# Patient Record
Sex: FEMALE | Race: OTHER | URBAN - METROPOLITAN AREA
[De-identification: names, ages, dates, MRNs, and addresses within clinical notes are randomized per-mention and may not be internally consistent; named-entity substitution may affect disease eponyms.]

---

## 2017-03-09 NOTE — PATIENT DISCUSSION
Continue: Refresh Optive Advanced (lctdhczurhsmacd-fjsajyg-dhba55): drops: 0.5-1-0.5% 1 drop twice a day into both eyes

## 2017-03-09 NOTE — PATIENT DISCUSSION
Continue: PreserVision AREDS 2 (vit c,t-cw-gydbq-lutein-zeaxan): capsule: 002-659-96-6 mg-unit-mg-mg 1 capsule twice a day by mouth

## 2017-03-09 NOTE — PATIENT DISCUSSION
Continue: Refresh Celluvisc (carboxymethylcellulose sodium): dropperette,gel: 1% 1 drop at bedtime into both eyes 03-

## 2017-03-09 NOTE — PATIENT DISCUSSION
AMD (DRY), OU:  PRESCRIBE AREDS 2 VITAMINS / AMSLER GRID DAILY / UV PROTECTION. SMOKING CESSATION EMPHASIZED. REFER TO DR. Nataly Kurtz FOR EVALUATION.

## 2017-09-05 NOTE — PATIENT DISCUSSION
Continue: PreserVision AREDS 2 (vit c,y-dd-ckytz-lutein-zeaxan): capsule: 335-588-43-8 mg-unit-mg-mg 1 capsule twice a day by mouth

## 2017-09-05 NOTE — PATIENT DISCUSSION
Continue: Refresh Optive Advanced (mnbkoccqgppywcw-wbbozxx-hduv07): drops: 0.5-1-0.5% 1 drop twice a day into both eyes

## 2018-01-29 ENCOUNTER — IMPORTED ENCOUNTER (OUTPATIENT)
Dept: URBAN - METROPOLITAN AREA CLINIC 9 | Facility: CLINIC | Age: 83
End: 2018-01-29

## 2018-03-16 ENCOUNTER — IMPORTED ENCOUNTER (OUTPATIENT)
Dept: URBAN - METROPOLITAN AREA CLINIC 9 | Facility: CLINIC | Age: 83
End: 2018-03-16

## 2018-03-28 NOTE — PATIENT DISCUSSION
Continue: Refresh Optive Advanced (syjjrnqaiiezkzm-dmfvqbi-dlvk68): drops: 0.5-1-0.5% 1 drop twice a day into both eyes

## 2018-03-28 NOTE — PATIENT DISCUSSION
Continue: PreserVision AREDS 2 (vit c,f-yv-yjbuh-lutein-zeaxan): capsule: 704-099-10-3 mg-unit-mg-mg 1 capsule twice a day by mouth

## 2018-03-28 NOTE — PATIENT DISCUSSION
MODERATE KERATOCONJUCTIVITIS WITH DRY EYE, OU:  CONTINUE HIGH QUALITY ARTIFICIAL TEARS BID - TID. RECOMMEND THE DAILY INTAKE OF OMEGA-3 DHA/EPA FATTY ACIDS TO HELP RELIEVE SYMPTOMS WITH PRIMARY CARE PHYSICIANS APPROVAL. ADD NIGHTLY LUBRICATING OINTMENT OR GEL. CONTINUE RESTASIS BID OU. WILL CONSIDER PUNCTAL PLUGS NEXT VISIT IF NOT RESPONSIVE OR IF SYMPTOMS PERSIST. RETURN FOR FOLLOW-UP AS SCHEDULED OR SOONER IF SYMPTOMS WORSEN.

## 2018-09-05 NOTE — PATIENT DISCUSSION
Continue: Refresh Optive Advanced (skzwtghizkimztb-xsrscbb-gsdw68): drops: 0.5-1-0.5% 1 drop twice a day into both eyes

## 2018-09-05 NOTE — PATIENT DISCUSSION
Continue: PreserVision AREDS 2 (vit c,j-us-knkif-lutein-zeaxan): capsule: 521-121-75-8 mg-unit-mg-mg 1 capsule twice a day by mouth

## 2019-03-06 NOTE — PATIENT DISCUSSION
Continue: PreserVision AREDS 2 (vit c,i-zo-dtpxj-lutein-zeaxan): capsule: 451-534-78-9 mg-unit-mg-mg 1 capsule twice a day by mouth

## 2019-03-06 NOTE — PATIENT DISCUSSION
MODERATE KERATOCONJUCTIVITIS WITH DRY EYE, OU:  CONTINUE HIGH QUALITY ARTIFICIAL TEARS BID - TID. RECOMMEND THE DAILY INTAKE OF OMEGA-3 DHA/EPA FATTY ACIDS TO HELP RELIEVE SYMPTOMS WITH PRIMARY CARE PHYSICIANS APPROVAL. CONTINUE RESTASIS BID OU. WILL CONSIDER PUNCTAL PLUGS NEXT VISIT IF NOT RESPONSIVE OR IF SYMPTOMS PERSIST. RETURN FOR FOLLOW-UP AS SCHEDULED OR SOONER IF SYMPTOMS WORSEN.

## 2019-07-29 ENCOUNTER — OUTPATIENT (OUTPATIENT)
Dept: OUTPATIENT SERVICES | Facility: HOSPITAL | Age: 84
LOS: 1 days | End: 2019-07-29

## 2020-02-03 ENCOUNTER — IMPORTED ENCOUNTER (OUTPATIENT)
Dept: URBAN - METROPOLITAN AREA CLINIC 9 | Facility: CLINIC | Age: 85
End: 2020-02-03

## 2020-03-09 NOTE — PATIENT DISCUSSION
03/09/2020OS-2.75+2.2585+[id number]/30 +&nbsp;SN &nbsp; &nbsp; cbd
AMD (DRY), OU:  APPEARS STABLE. NO NEED FOR IMMEDIATE TREATMENT WITH RETINAL SPECIALIST AT THIS POINT. PRESCRIBE AREDS 2 VITAMINS / AMSLER GRID DAILY / UV PROTECTION. SMOKING AVOIDANCE ADVISED. PATIENT TO CONTACT OFFICE IMMEDIATELY IF ANY CHANGES IN AMSLER GRID OCCUR. RETURN FOR FOLLOW-UP AS SCHEDULED. RECOMMEND PATIENT RETURN TO DR. Denis Arreaga IN NEXT 6 MTS.
AMD (Dry) Counseling:  I have instructed the patient to take an AREDS 2 vitamin mixture to minimize the risk of disease progression. The importance of daily monitoring with Amsler grid was emphasized and an Amsler grid was provided if the patient did not have one. The patient was advised to call the office if new symptoms of persistent blurring or distortion of vision arise as evaluation and possible treatment is necessary to preserve as much vision as possible. Return for follow-up as scheduled.
Anti-reflective
COUNSELING:
Comments: **Patient has not been using and needs the name again.
Comments:*NE
Continue: PreserVision AREDS 2 (vit c,u-cs-vcfov-lutein-zeaxan): capsule: 303-141-17-6 mg-unit-mg-mg 1 capsule twice a day by mouth
Continue: Refresh Celluvisc (carboxymethylcellulose sodium): dropperette,gel: 1% 1 drop at bedtime into both eyes 03-
Continue: Refresh Optive (carboxymethylcellulose-glycern): drops: 0.5-0.9% 1 drop twice a day into both eyes
Continue: Restasis (cyclosporine): dropperette: 0.05% 1 drop twice a day as directed into both eyes
DIABETES WITHOUT RETINOPATHY: RETURN FOR FOLLOW-UP AS SCHEDULED FOR DILATED EXAM.
Diabetes without Retinopathy Counseling:  I have discussed with the patient the importance of controlling blood glucose, blood pressure and lipid levels to minimize the risk of developing retinal disease from diabetes. Explained the importance of annual dilated eye exams because effective treatment for diabetic retinopathy depends on timely intervention. The patient was instructed to call or return sooner if they noticed blurred or distorted vision, fluctuating vision, pain or redness around one or both eyes. Return for follow-up as scheduled.
Expiration:03/09/2021
General:
Glasses Prescribed:
Keratoconjuctivitis with dry eye Counseling: I have discussed the diagnosis and the pathophysiology of this disease with the patient. Vision may be limited by dry eye, and symptoms exacerbated by environmental factors such as smoke, wind, or prolonged eye use. Treatment options include, but are not limited to, artificial tears, punctal plugs, topical cyclosporine, oral omega-3 supplements, Lipiflow, moisture goggles, and lubricating ointments. I stressed the importance of compliance with treatment.
Lens Material:
Lens Treatment:
MODERATE KERATOCONJUCTIVITIS WITH DRY EYE, OU:  CONTINUE HIGH QUALITY ARTIFICIAL TEARS BID - TID. RECOMMEND THE DAILY INTAKE OF OMEGA-3 DHA/EPA FATTY ACIDS TO HELP RELIEVE SYMPTOMS WITH PRIMARY CARE PHYSICIANS APPROVAL. ADD NIGHTLY LUBRICATING OINTMENT OR GEL. CONTINUE RESTASIS BID OU. WILL CONSIDER PUNCTAL PLUGS NEXT VISIT IF NOT RESPONSIVE OR IF SYMPTOMS PERSIST. RETURN FOR FOLLOW-UP AS SCHEDULED OR SOONER IF SYMPTOMS WORSEN.
Medications:
POSTERIOR VITREOUS DETACHMENT WITH VITREOUS FLOATERS, OU: NO HOLES OR NO TEARS 360' WITH INDIRECT EXAM, OU. F&amp;F PRECAUTIONS REVIEWED WITH THE PATIENT. RETURN  AS SCHEDULED.
Premium Progressive
Progressive - Daily wearOD-1.50+2.0070+2.250307/25 +&nbsp;SN &nbsp; &nbsp; cbd
Slab Off:No
UV Protection
Vertex Distance:
spherecylinderaxisaddprismvertexVAInt VANVExaminer
7.5 mm
none

## 2020-10-28 ENCOUNTER — IMPORTED ENCOUNTER (OUTPATIENT)
Dept: URBAN - METROPOLITAN AREA CLINIC 9 | Facility: CLINIC | Age: 85
End: 2020-10-28

## 2020-11-25 NOTE — PATIENT DISCUSSION
Continue: PreserVision AREDS 2 (vit c,q-ns-ijaat-lutein-zeaxan): capsule: 412-997-34-7 mg-unit-mg-mg 1 capsule twice a day by mouth

## 2021-02-15 ENCOUNTER — IMPORTED ENCOUNTER (OUTPATIENT)
Dept: URBAN - METROPOLITAN AREA CLINIC 9 | Facility: CLINIC | Age: 86
End: 2021-02-15

## 2021-03-05 ENCOUNTER — IMPORTED ENCOUNTER (OUTPATIENT)
Dept: URBAN - METROPOLITAN AREA CLINIC 9 | Facility: CLINIC | Age: 86
End: 2021-03-05

## 2021-03-08 NOTE — PATIENT DISCUSSION
LONG DISCUSSION WITH PATIENT THAT SIGNIFICANT DRY EYE MAY DELAY HEALING OF THE FLAP OR INTERFERE WITH THE SURFACE AND CLARITY AFTER SURGERY MAKING THE OUTCOME UNPREDICTABLE AND THEREFORE NOT RECOMMENDED.

## 2021-03-24 ENCOUNTER — IMPORTED ENCOUNTER (OUTPATIENT)
Dept: URBAN - METROPOLITAN AREA CLINIC 9 | Facility: CLINIC | Age: 86
End: 2021-03-24

## 2021-03-25 NOTE — PATIENT DISCUSSION
Continue: PreserVision AREDS 2 (vit c,f-za-gkwrq-lutein-zeaxan): capsule: 650-879-94-5 mg-unit-mg-mg 1 capsule twice a day by mouth

## 2021-03-31 ENCOUNTER — IMPORTED ENCOUNTER (OUTPATIENT)
Dept: URBAN - METROPOLITAN AREA CLINIC 9 | Facility: CLINIC | Age: 86
End: 2021-03-31

## 2021-04-21 ENCOUNTER — IMPORTED ENCOUNTER (OUTPATIENT)
Dept: URBAN - METROPOLITAN AREA CLINIC 9 | Facility: CLINIC | Age: 86
End: 2021-04-21

## 2021-05-04 ENCOUNTER — IMPORTED ENCOUNTER (OUTPATIENT)
Dept: URBAN - METROPOLITAN AREA CLINIC 9 | Facility: CLINIC | Age: 86
End: 2021-05-04

## 2021-05-20 NOTE — PATIENT DISCUSSION
REFRACTIVE ERROR OU: LENS INCORRECT OD, BUT UPDATED SRX RELEASED TO PATIENT ANYWAYS. DOCTORS CHANGE. RTC IF DISCOMFORT OR SI/SX PERSIST.

## 2021-05-20 NOTE — PATIENT DISCUSSION
Continue: PreserVision AREDS 2 (vit c,u-hd-jesdv-lutein-zeaxan): capsule: 306-952-77-5 mg-unit-mg-mg 1 capsule twice a day by mouth

## 2021-10-16 ASSESSMENT — VISUAL ACUITY
OS_CC: 20/30 -2 SN
OS_SC: 20/80 SN
OS_SC: 20/200 SN
OD_CC: 20/100 SN
OS_CC: 20/70 SN
OD_CC: 20/60 +2 SN
OS_CC: 20/60 - SN
OS_PH: 20/80 - SN
OS_CC: 20/30 - SN
OS_SC: 20/400 SN
OS_CC: 20/60 +2 SN
OD_SC: 20/100 SN
OD_CC: 20/70 SN
OD_CC: 20/60 +2 SN
OD_CC: 20/40 -2 SN
OD_CC: 20/50 -2 SN
OS_CC: 20/50 +2 SN
OD_SC: 20/400 SN
OD_PH: 20/50 -2 SN
OD_CC: 20/50 SN
OD_SC: 20/200 + SN
OD_CC: 20/40 -2 SN
OD_CC: 20/60 +2 SN
OD_CC: 20/60 + SN
OD_CC: 20/70 - SN
OD_SC: 20/100 SN
OS_CC: 20/40 -2 SN
OD_SC: 20/100 -2 SN
OS_SC: 20/80 +2 SN
OS_CC: 20/30 - SN
OD_CC: 20/50 + SN
OD_SC: 20/200 + SN
OS_SC: 20/80 - SN

## 2021-10-16 ASSESSMENT — TONOMETRY
OS_IOP_MMHG: 15
OS_IOP_MMHG: 12
OD_IOP_MMHG: 10
OS_IOP_MMHG: 19
OD_IOP_MMHG: 15
OD_IOP_MMHG: 13
OS_IOP_MMHG: 14
OD_IOP_MMHG: 10
OS_IOP_MMHG: 10
OD_IOP_MMHG: 13
OD_IOP_MMHG: 12
OD_IOP_MMHG: 14
OS_IOP_MMHG: 13

## 2021-10-16 ASSESSMENT — KERATOMETRY
OD_AXISANGLE2_DEGREES: 15
OD_K1POWER_DIOPTERS: 44.25
OD_AXISANGLE_DEGREES: 94
OS_AXISANGLE2_DEGREES: 166
OS_AXISANGLE2_DEGREES: 5
OD_K2POWER_DIOPTERS: 45.75
OD_K2POWER_DIOPTERS: 45.75
OS_AXISANGLE2_DEGREES: 162
OD_K1POWER_DIOPTERS: 44
OD_K2POWER_DIOPTERS: 45.75
OS_K1POWER_DIOPTERS: 44
OD_K2POWER_DIOPTERS: 45.75
OS_K2POWER_DIOPTERS: 45.25
OD_AXISANGLE2_DEGREES: 10
OS_K2POWER_DIOPTERS: 45.25
OS_K2POWER_DIOPTERS: 45
OD_K2POWER_DIOPTERS: 45.5
OS_K2POWER_DIOPTERS: 45
OS_AXISANGLE2_DEGREES: 174
OS_AXISANGLE_DEGREES: 65
OD_AXISANGLE_DEGREES: 105
OS_AXISANGLE_DEGREES: 76
OS_K1POWER_DIOPTERS: 44
OS_K2POWER_DIOPTERS: 45.25
OS_K1POWER_DIOPTERS: 44
OS_K1POWER_DIOPTERS: 44
OD_K2POWER_DIOPTERS: 45.5
OS_AXISANGLE2_DEGREES: 155
OD_AXISANGLE2_DEGREES: 8
OS_AXISANGLE_DEGREES: 95
OD_AXISANGLE2_DEGREES: 4
OD_K1POWER_DIOPTERS: 44
OD_AXISANGLE_DEGREES: 98
OD_K1POWER_DIOPTERS: 44
OD_AXISANGLE2_DEGREES: 4
OD_AXISANGLE2_DEGREES: 9
OD_AXISANGLE_DEGREES: 94
OS_AXISANGLE_DEGREES: 72
OD_AXISANGLE_DEGREES: 99
OD_K1POWER_DIOPTERS: 44
OD_K1POWER_DIOPTERS: 44.5
OS_K1POWER_DIOPTERS: 44
OS_AXISANGLE_DEGREES: 84
OD_AXISANGLE_DEGREES: 100

## 2021-11-30 ENCOUNTER — GLASSES RECHECK (OUTPATIENT)
Dept: URBAN - METROPOLITAN AREA CLINIC 9 | Facility: CLINIC | Age: 86
End: 2021-11-30

## 2021-11-30 DIAGNOSIS — H51.8: ICD-10-CM

## 2021-11-30 PROCEDURE — 92012 INTRM OPH EXAM EST PATIENT: CPT

## 2021-11-30 ASSESSMENT — VISUAL ACUITY
OS_CC: 20/25
OD_CC: 20/50+1

## 2022-03-02 ENCOUNTER — ESTABLISHED PATIENT (OUTPATIENT)
Dept: URBAN - METROPOLITAN AREA CLINIC 9 | Facility: CLINIC | Age: 87
End: 2022-03-02

## 2022-03-02 DIAGNOSIS — H35.3132: ICD-10-CM

## 2022-03-02 DIAGNOSIS — H43.813: ICD-10-CM

## 2022-03-02 DIAGNOSIS — D31.31: ICD-10-CM

## 2022-03-02 PROCEDURE — 92134 CPTRZ OPH DX IMG PST SGM RTA: CPT

## 2022-03-02 PROCEDURE — 92014 COMPRE OPH EXAM EST PT 1/>: CPT

## 2022-03-02 ASSESSMENT — VISUAL ACUITY
OS_CC: 20/25+1
OD_CC: 20/40-1

## 2022-03-02 ASSESSMENT — TONOMETRY
OD_IOP_MMHG: 13
OS_IOP_MMHG: 18

## 2022-07-05 RX ORDER — ATORVASTATIN CALCIUM 20 MG/1
TABLET, FILM COATED ORAL
COMMUNITY

## 2022-09-26 ENCOUNTER — ESTABLISHED PATIENT (OUTPATIENT)
Dept: URBAN - METROPOLITAN AREA CLINIC 9 | Facility: CLINIC | Age: 87
End: 2022-09-26

## 2022-09-26 DIAGNOSIS — H04.123: ICD-10-CM

## 2022-09-26 DIAGNOSIS — H26.493: ICD-10-CM

## 2022-09-26 DIAGNOSIS — H35.3132: ICD-10-CM

## 2022-09-26 DIAGNOSIS — H52.13: ICD-10-CM

## 2022-09-26 DIAGNOSIS — H43.813: ICD-10-CM

## 2022-09-26 DIAGNOSIS — H18.593: ICD-10-CM

## 2022-09-26 DIAGNOSIS — D31.31: ICD-10-CM

## 2022-09-26 PROCEDURE — 92015 DETERMINE REFRACTIVE STATE: CPT

## 2022-09-26 PROCEDURE — 92014 COMPRE OPH EXAM EST PT 1/>: CPT

## 2022-09-26 ASSESSMENT — TONOMETRY
OD_IOP_MMHG: 12
OS_IOP_MMHG: 12

## 2022-09-26 ASSESSMENT — VISUAL ACUITY
OD_SC: 20/50
OS_SC: 20/60-1

## 2023-05-03 ENCOUNTER — ESTABLISHED PATIENT (OUTPATIENT)
Dept: URBAN - METROPOLITAN AREA CLINIC 9 | Facility: CLINIC | Age: 88
End: 2023-05-03

## 2023-05-03 DIAGNOSIS — H35.3132: ICD-10-CM

## 2023-05-03 DIAGNOSIS — H43.813: ICD-10-CM

## 2023-05-03 DIAGNOSIS — D31.31: ICD-10-CM

## 2023-05-03 PROCEDURE — 92014 COMPRE OPH EXAM EST PT 1/>: CPT

## 2023-05-03 PROCEDURE — 92134 CPTRZ OPH DX IMG PST SGM RTA: CPT

## 2023-05-03 ASSESSMENT — TONOMETRY
OD_IOP_MMHG: 13
OS_IOP_MMHG: 13

## 2023-05-03 ASSESSMENT — VISUAL ACUITY
OS_SC: 20/60
OD_PH: 20/70-1
OS_PH: 20/40-2
OD_SC: 20/80

## 2023-12-28 ENCOUNTER — ESTABLISHED PATIENT (OUTPATIENT)
Facility: LOCATION | Age: 88
End: 2023-12-28

## 2023-12-28 ASSESSMENT — KERATOMETRY
OS_AXISANGLE_DEGREES: 77
OD_K2POWER_DIOPTERS: 45.75
OD_K1POWER_DIOPTERS: 43.75
OD_AXISANGLE2_DEGREES: 2
OS_AXISANGLE2_DEGREES: 167
OS_K2POWER_DIOPTERS: 45.75
OS_K1POWER_DIOPTERS: 43.50
OD_AXISANGLE_DEGREES: 92

## 2023-12-28 ASSESSMENT — VISUAL ACUITY
OS_BCVA: 20/30
OD_BCVA: 20/50
OS_CC: 20/40-1
OD_PH: 20/60
OD_CC: 20/70-1

## 2023-12-28 ASSESSMENT — TONOMETRY
OD_IOP_MMHG: 14
OS_IOP_MMHG: 13

## 2024-05-08 ENCOUNTER — ESTABLISHED PATIENT (OUTPATIENT)
Facility: LOCATION | Age: 89
End: 2024-05-08

## 2024-05-08 DIAGNOSIS — H43.813: ICD-10-CM

## 2024-05-08 DIAGNOSIS — D31.31: ICD-10-CM

## 2024-05-08 DIAGNOSIS — H35.3132: ICD-10-CM

## 2024-05-08 PROCEDURE — 99214 OFFICE O/P EST MOD 30 MIN: CPT

## 2024-05-08 PROCEDURE — 92250 FUNDUS PHOTOGRAPHY W/I&R: CPT

## 2024-05-08 ASSESSMENT — VISUAL ACUITY
OD_CC: 20/60-1
OS_CC: 20/40-2
OU_SC: 20/50-2
OD_SC: 20/70-1
OS_SC: 20/50-2
OU_CC: 20/40-2

## 2024-05-08 ASSESSMENT — TONOMETRY
OD_IOP_MMHG: 16
OS_IOP_MMHG: 15

## 2024-11-06 ENCOUNTER — COMPREHENSIVE EXAM (OUTPATIENT)
Facility: LOCATION | Age: 89
End: 2024-11-06

## 2024-11-06 DIAGNOSIS — H26.493: ICD-10-CM

## 2024-11-06 DIAGNOSIS — H35.3132: ICD-10-CM

## 2024-11-06 DIAGNOSIS — H52.13: ICD-10-CM

## 2024-11-06 DIAGNOSIS — H04.123: ICD-10-CM

## 2024-11-06 PROCEDURE — 92015 DETERMINE REFRACTIVE STATE: CPT

## 2024-11-06 PROCEDURE — 92014 COMPRE OPH EXAM EST PT 1/>: CPT

## 2024-11-06 PROCEDURE — 92134 CPTRZ OPH DX IMG PST SGM RTA: CPT

## 2025-05-14 ENCOUNTER — COMPREHENSIVE EXAM (OUTPATIENT)
Age: 89
End: 2025-05-14

## 2025-05-14 DIAGNOSIS — H35.3132: ICD-10-CM

## 2025-05-14 DIAGNOSIS — H43.813: ICD-10-CM

## 2025-05-14 DIAGNOSIS — D31.31: ICD-10-CM

## 2025-05-14 PROCEDURE — 92134 CPTRZ OPH DX IMG PST SGM RTA: CPT

## 2025-05-14 PROCEDURE — 99214 OFFICE O/P EST MOD 30 MIN: CPT

## 2025-05-14 PROCEDURE — 92201 OPSCPY EXTND RTA DRAW UNI/BI: CPT

## 2025-08-27 ENCOUNTER — EMERGENCY VISIT (OUTPATIENT)
Age: 89
End: 2025-08-27

## 2025-08-27 DIAGNOSIS — H35.3132: ICD-10-CM

## 2025-08-27 DIAGNOSIS — H43.813: ICD-10-CM

## 2025-08-27 DIAGNOSIS — D31.31: ICD-10-CM

## 2025-08-27 PROCEDURE — 92134 CPTRZ OPH DX IMG PST SGM RTA: CPT

## 2025-08-27 PROCEDURE — 99214 OFFICE O/P EST MOD 30 MIN: CPT
